# Patient Record
Sex: FEMALE | Race: BLACK OR AFRICAN AMERICAN | NOT HISPANIC OR LATINO | URBAN - METROPOLITAN AREA
[De-identification: names, ages, dates, MRNs, and addresses within clinical notes are randomized per-mention and may not be internally consistent; named-entity substitution may affect disease eponyms.]

---
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
## 2023-06-29 ENCOUNTER — EMERGENCY (EMERGENCY)
Facility: HOSPITAL | Age: 18
LOS: 0 days | Discharge: HOME | End: 2023-06-29
Attending: STUDENT IN AN ORGANIZED HEALTH CARE EDUCATION/TRAINING PROGRAM
Payer: COMMERCIAL

## 2023-06-29 VITALS
DIASTOLIC BLOOD PRESSURE: 73 MMHG | HEART RATE: 60 BPM | SYSTOLIC BLOOD PRESSURE: 109 MMHG | RESPIRATION RATE: 18 BRPM | OXYGEN SATURATION: 100 % | TEMPERATURE: 98 F

## 2023-06-29 VITALS — WEIGHT: 125 LBS | HEIGHT: 69 IN

## 2023-06-29 DIAGNOSIS — R07.9 CHEST PAIN, UNSPECIFIED: ICD-10-CM

## 2023-06-29 DIAGNOSIS — Z88.6 ALLERGY STATUS TO ANALGESIC AGENT: ICD-10-CM

## 2023-06-29 DIAGNOSIS — R35.0 FREQUENCY OF MICTURITION: ICD-10-CM

## 2023-06-29 DIAGNOSIS — R07.0 PAIN IN THROAT: ICD-10-CM

## 2023-06-29 DIAGNOSIS — B34.9 VIRAL INFECTION, UNSPECIFIED: ICD-10-CM

## 2023-06-29 DIAGNOSIS — R51.9 HEADACHE, UNSPECIFIED: ICD-10-CM

## 2023-06-29 DIAGNOSIS — R39.15 URGENCY OF URINATION: ICD-10-CM

## 2023-06-29 LAB
ALBUMIN SERPL ELPH-MCNC: 4.1 G/DL — SIGNIFICANT CHANGE UP (ref 3.3–5)
ALP SERPL-CCNC: 85 U/L — SIGNIFICANT CHANGE UP (ref 40–120)
ALT FLD-CCNC: 12 U/L — SIGNIFICANT CHANGE UP (ref 12–78)
ANION GAP SERPL CALC-SCNC: 3 MMOL/L — LOW (ref 5–17)
APPEARANCE UR: CLEAR — SIGNIFICANT CHANGE UP
AST SERPL-CCNC: 8 U/L — LOW (ref 15–37)
BASOPHILS # BLD AUTO: 0.03 K/UL — SIGNIFICANT CHANGE UP (ref 0–0.2)
BASOPHILS NFR BLD AUTO: 0.4 % — SIGNIFICANT CHANGE UP (ref 0–2)
BILIRUB SERPL-MCNC: 0.4 MG/DL — SIGNIFICANT CHANGE UP (ref 0.2–1.2)
BILIRUB UR-MCNC: NEGATIVE — SIGNIFICANT CHANGE UP
BUN SERPL-MCNC: 11 MG/DL — SIGNIFICANT CHANGE UP (ref 7–23)
CALCIUM SERPL-MCNC: 9.2 MG/DL — SIGNIFICANT CHANGE UP (ref 8.5–10.1)
CHLORIDE SERPL-SCNC: 107 MMOL/L — SIGNIFICANT CHANGE UP (ref 96–108)
CO2 SERPL-SCNC: 30 MMOL/L — SIGNIFICANT CHANGE UP (ref 22–31)
COLOR SPEC: YELLOW — SIGNIFICANT CHANGE UP
CREAT SERPL-MCNC: 0.86 MG/DL — SIGNIFICANT CHANGE UP (ref 0.5–1.3)
DIFF PNL FLD: NEGATIVE — SIGNIFICANT CHANGE UP
EGFR: 100 ML/MIN/1.73M2 — SIGNIFICANT CHANGE UP
EOSINOPHIL # BLD AUTO: 0.17 K/UL — SIGNIFICANT CHANGE UP (ref 0–0.5)
EOSINOPHIL NFR BLD AUTO: 2.5 % — SIGNIFICANT CHANGE UP (ref 0–6)
GLUCOSE SERPL-MCNC: 88 MG/DL — SIGNIFICANT CHANGE UP (ref 70–99)
GLUCOSE UR QL: NEGATIVE — SIGNIFICANT CHANGE UP
HCT VFR BLD CALC: 37.9 % — SIGNIFICANT CHANGE UP (ref 34.5–45)
HGB BLD-MCNC: 13.1 G/DL — SIGNIFICANT CHANGE UP (ref 11.5–15.5)
IMM GRANULOCYTES NFR BLD AUTO: 0.1 % — SIGNIFICANT CHANGE UP (ref 0–0.9)
KETONES UR-MCNC: NEGATIVE — SIGNIFICANT CHANGE UP
LEUKOCYTE ESTERASE UR-ACNC: ABNORMAL
LYMPHOCYTES # BLD AUTO: 3.09 K/UL — SIGNIFICANT CHANGE UP (ref 1–3.3)
LYMPHOCYTES # BLD AUTO: 46.3 % — HIGH (ref 13–44)
MCHC RBC-ENTMCNC: 31 PG — SIGNIFICANT CHANGE UP (ref 27–34)
MCHC RBC-ENTMCNC: 34.6 GM/DL — SIGNIFICANT CHANGE UP (ref 32–36)
MCV RBC AUTO: 89.6 FL — SIGNIFICANT CHANGE UP (ref 80–100)
MONOCYTES # BLD AUTO: 0.41 K/UL — SIGNIFICANT CHANGE UP (ref 0–0.9)
MONOCYTES NFR BLD AUTO: 6.1 % — SIGNIFICANT CHANGE UP (ref 2–14)
NEUTROPHILS # BLD AUTO: 2.96 K/UL — SIGNIFICANT CHANGE UP (ref 1.8–7.4)
NEUTROPHILS NFR BLD AUTO: 44.6 % — SIGNIFICANT CHANGE UP (ref 43–77)
NITRITE UR-MCNC: NEGATIVE — SIGNIFICANT CHANGE UP
PH UR: 7 — SIGNIFICANT CHANGE UP (ref 5–8)
PLATELET # BLD AUTO: 284 K/UL — SIGNIFICANT CHANGE UP (ref 150–400)
POTASSIUM SERPL-MCNC: 4 MMOL/L — SIGNIFICANT CHANGE UP (ref 3.5–5.3)
POTASSIUM SERPL-SCNC: 4 MMOL/L — SIGNIFICANT CHANGE UP (ref 3.5–5.3)
PROT SERPL-MCNC: 7.7 GM/DL — SIGNIFICANT CHANGE UP (ref 6–8.3)
PROT UR-MCNC: NEGATIVE — SIGNIFICANT CHANGE UP
RBC # BLD: 4.23 M/UL — SIGNIFICANT CHANGE UP (ref 3.8–5.2)
RBC # FLD: 12 % — SIGNIFICANT CHANGE UP (ref 10.3–14.5)
SODIUM SERPL-SCNC: 140 MMOL/L — SIGNIFICANT CHANGE UP (ref 135–145)
SP GR SPEC: 1.01 — SIGNIFICANT CHANGE UP (ref 1.01–1.02)
TROPONIN I, HIGH SENSITIVITY RESULT: <3 NG/L — SIGNIFICANT CHANGE UP
UROBILINOGEN FLD QL: NEGATIVE — SIGNIFICANT CHANGE UP
WBC # BLD: 6.67 K/UL — SIGNIFICANT CHANGE UP (ref 3.8–10.5)
WBC # FLD AUTO: 6.67 K/UL — SIGNIFICANT CHANGE UP (ref 3.8–10.5)

## 2023-06-29 PROCEDURE — 99284 EMERGENCY DEPT VISIT MOD MDM: CPT

## 2023-06-29 PROCEDURE — 81001 URINALYSIS AUTO W/SCOPE: CPT

## 2023-06-29 PROCEDURE — 87086 URINE CULTURE/COLONY COUNT: CPT

## 2023-06-29 PROCEDURE — 85025 COMPLETE CBC W/AUTO DIFF WBC: CPT

## 2023-06-29 PROCEDURE — 87591 N.GONORRHOEAE DNA AMP PROB: CPT

## 2023-06-29 PROCEDURE — 80053 COMPREHEN METABOLIC PANEL: CPT

## 2023-06-29 PROCEDURE — 36415 COLL VENOUS BLD VENIPUNCTURE: CPT

## 2023-06-29 PROCEDURE — 71045 X-RAY EXAM CHEST 1 VIEW: CPT

## 2023-06-29 PROCEDURE — 99285 EMERGENCY DEPT VISIT HI MDM: CPT | Mod: 25

## 2023-06-29 PROCEDURE — 93005 ELECTROCARDIOGRAM TRACING: CPT

## 2023-06-29 PROCEDURE — 93010 ELECTROCARDIOGRAM REPORT: CPT

## 2023-06-29 PROCEDURE — 71045 X-RAY EXAM CHEST 1 VIEW: CPT | Mod: 26

## 2023-06-29 PROCEDURE — 84484 ASSAY OF TROPONIN QUANT: CPT

## 2023-06-29 RX ORDER — ACETAMINOPHEN 500 MG
650 TABLET ORAL ONCE
Refills: 0 | Status: COMPLETED | OUTPATIENT
Start: 2023-06-29 | End: 2023-06-29

## 2023-06-29 RX ADMIN — Medication 650 MILLIGRAM(S): at 22:27
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
## 2023-06-29 NOTE — ED STATDOCS - EYES, MLM
Staff message received from Jesus Aj in PT with recommendation for work conditioning due to patient report of limitations in space to do home exercises for work simulated activities.  Provider is agreeable to this with order placed in [facility] and [facility] CM to contact patient to inform her of plan.   
clear bilaterally.  Pupils equal, round, and reactive to light.

## 2023-06-29 NOTE — ED STATDOCS - PATIENT PORTAL LINK FT
You can access the FollowMyHealth Patient Portal offered by [facility] by registering at the following website: [email or web address]. By joining [facility]’s FollowMyHealth portal, you will also be able to view your health information using other applications (apps) compatible with our system.
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
## 2023-06-29 NOTE — ED STATDOCS - NSFOLLOWUPINSTRUCTIONS_ED_ALL_ED_FT
[facility] General Internal Medicine  General Internal Medicine  [address] [place] [address]  Phone: [phone]  Fax:     [facility]  Internal Medicine  [address] [place] [address]  Phone: [phone]  Fax: [phone] Syndrome    WHAT YOU NEED TO KNOW:    Viral syndrome is a term used for symptoms of an infection caused by a virus. Viruses are spread easily from person to person through the air and on shared items.    DISCHARGE INSTRUCTIONS:    Call your local emergency number (911 in the ) or have someone else call if:    You have a seizure.    You cannot be woken.    You have chest pain or trouble breathing.  Seek care immediately if:    You have a stiff neck, a bad headache, and sensitivity to light.    You feel weak, dizzy, or confused.    You stop urinating or urinate a lot less than usual.    You cough up blood or thick yellow or green mucus.    You have severe abdominal pain or your abdomen is larger than usual.  Call your doctor if:    Your symptoms do not get better with treatment or get worse after 3 days.    You have a rash or ear pain.    You have burning when you urinate.    You have questions or concerns about your condition or care.  Medicines: You may need any of the following:    Acetaminophen decreases pain and fever. It is available without a doctor's order. Ask how much to take and how often to take it. Follow directions. Read the labels of all other medicines you are using to see if they also contain acetaminophen, or ask your doctor or pharmacist. Acetaminophen can cause liver damage if not taken correctly. Do not use more than 4 grams (4,000 milligrams) total of acetaminophen in one day.    NSAIDs, such as ibuprofen, help decrease swelling, pain, and fever. NSAIDs can cause stomach bleeding or kidney problems in certain people. If you take blood thinner medicine, always ask your healthcare provider if NSAIDs are safe for you. Always read the medicine label and follow directions.    Cold medicine helps decrease swelling, control a cough, and relieve chest or nasal congestion.    Saline nasal spray helps decrease nasal congestion.    Take your medicine as directed. Contact your healthcare provider if you think your medicine is not helping or if you have side effects. Tell him of her if you are allergic to any medicine. Keep a list of the medicines, vitamins, and herbs you take. Include the amounts, and when and why you take them. Bring the list or the pill bottles to follow-up visits. Carry your medicine list with you in case of an emergency.  Manage your symptoms:    Drink liquids as directed to prevent dehydration. Ask how much liquid to drink each day and which liquids are best for you. Ask if you should drink an oral rehydration solution (ORS). An ORS has the right amounts of water, salts, and sugar you need to replace body fluids. This may help prevent dehydration caused by vomiting or diarrhea. Do not drink liquids with caffeine. Liquids with caffeine can make dehydration worse.    Get plenty of rest to help your body heal. Take naps throughout the day. Ask your healthcare provider when you can return to work and your normal activities.    Use a cool mist humidifier to help you breathe easier. Ask your healthcare provider how to use a cool mist humidifier.    Eat honey or use cough drops for a sore throat. Cough drops are available without a doctor's order. Follow directions for taking cough drops.    Do not smoke or be close to anyone who is smoking. Nicotine and other chemicals in cigarettes and cigars can cause lung damage. Smoking can also delay healing. Ask your healthcare provider for information if you currently smoke and need help to quit. E-cigarettes or smokeless tobacco still contain nicotine. Talk to your healthcare provider before you use these products.  Prevent the spread of germs:      Wash your hands often. Wash your hands several times each day. Wash after you use the bathroom, change a child's diaper, and before you prepare or eat food. Use soap and water every time. Rub your soapy hands together, lacing your fingers. Wash the front and back of your hands, and in between your fingers. Use the fingers of one hand to scrub under the fingernails of the other hand. Wash for at least 20 seconds. Rinse with warm, running water for several seconds. Then dry your hands with a clean towel or paper towel. Use hand  that contains alcohol if soap and water are not available. Do not touch your eyes, nose, or mouth without washing your hands first.  Handwashing      Cover a sneeze or cough. Use a tissue that covers your mouth and nose. Throw the tissue away in a trash can right away. Use the bend of your arm if a tissue is not available. Wash your hands well with soap and water or use a hand .    Stay away from others while you are sick. Avoid crowds as much as possible.    Ask about vaccines you may need. Talk to your healthcare provider about your vaccine history. He or she will tell you which vaccines you need, and when to get them.  Get the influenza (flu) vaccine as soon as recommended each year. The flu vaccine is available starting in September or October. Flu viruses change, so it is important to get a flu vaccine every year.    Get the pneumonia vaccine if recommended. This vaccine is usually recommended every 5 years. Your provider will tell you when to get this vaccine, if needed.  Follow up with your doctor as directed: Write down your questions so you remember to ask them during your visits.    Síndrome viral    LO QUE NECESITA SABER:    El síndrome viral es un término usado para los síntomas de annita infección causada por un virus. Los virus son propagados fácilmente de annita persona a otra a través del aire y mediante los objetos que se comparten.    INSTRUCCIONES SOBRE EL MARIAM HOSPITALARIA:    Llame al número local de emergencias (911 en los [place]), o pídale a alguien que llame si:    Usted sufre annita convulsión.    No es posible despertarlo.    Usted tiene dolor torácico y dificultad para respirar.  Busque atención médica de inmediato si:    Usted tiene rigidez en el rebecca, dolor de sergio intenso y sensibilidad a la fifi.    Usted se siente débil, mareado o confundido.    Usted juventino de orinar u orina menos de lo habitual.    Usted tose pio o annita mucosidad espesa amarilla o michael.    Usted tiene dolor abdominal severo o wood abdomen está más handy de lo habitual.  Llame a wood médico si:    Nahomy síntomas no mejoran con el tratamiento o empeoran después de 3 días.    Usted tiene salpullido o dolor de oído.    Usted siente ardor al orinar.    Usted tiene preguntas o inquietudes acerca de wood condición o cuidado.  Medicamentos:Es posible que usted necesite alguno de los siguientes:    Acetaminofénalivia el dolor y baja la fiebre. Está disponible sin receta médica. Pregunte la cantidad y la frecuencia con que debe tomarlos. Siga las indicaciones. Shazia las etiquetas de todos los demás medicamentos que esté usando para saber si también contienen acetaminofén, o pregunte a wood médico o farmacéutico. El acetaminofén puede causar daño en el hígado cuando no se katey de forma correcta. No use más de 4 gramos (4000 miligramos) en total de acetaminofeno en un día.    Los ALICIA,ina el ibuprofeno, ayudan a disminuir la inflamación, el dolor y la fiebre. Los ALICIA pueden causar sangrado estomacal o problemas renales en ciertas personas. Si usted katey un medicamento anticoagulante, siempre pregúntele a wood médico si los ALICIA son seguros para usted. Siempre shazia la etiqueta de varinder medicamento y siga las instrucciones.    El medicamento para el resfriadoayuda a disminuir la inflamación, a controlar la tos y a aliviar la congestión del pecho o nasal.    El rociador nasal de agua salinaayuda a disminuir la congestión nasal.    Champion nahomy medicamentos ina se le haya indicado.Consulte con wood médico si usted kristy que wood medicamento no le está ayudando o si presenta efectos secundarios. Infórmele si es alérgico a algún medicamento. Mantenga annita lista actualizada de los medicamentos, las vitaminas y los productos herbales que katey. Incluya los siguientes datos de los medicamentos: cantidad, frecuencia y motivo de administración. Traiga con usted la lista o los envases de las píldoras a nahomy citas de seguimiento. Lleve la lista de los medicamentos con usted en senia de annita emergencia.  El manejo de nahomy síntomas:    Champion líquidos ina se le indique para evitar annita deshidratación.Pregunte cuánto líquido debe charlotte cada día y cuáles líquidos son los más adecuados para usted. Pregunte si usted debería charlotte annita solución de rehidratación oral (SRO). Annita SRO tiene las cantidades exactas de agua, sal y azúcar que usted necesita para reemplazar los líquidos corporales. [place] podría ayudarlo a evitar la deshidratación a causa del vómito y de la diarrea. No tome líquidos con cafeína. Los líquidos con cafeína pueden empeorar la deshidratación.    Descanse lo suficiente para ayudar a que wood cuerpo sane.Champion siestas jose el día. Pregunte a wood médico cuándo puede regresar a wood trabajo y a nahomy actividades cotidianas.    Use un humidificador de hugo fría para respirar más fácilmente.Pregunte a wood médico cómo usar un humidificador de vapor frío.    Coma miel o use pastillas para la tos para el dolor de garganta.Los caramelos para la tos están disponibles sin receta médica. Siga las indicaciones para charlotte los caramelos para la tos.    No fume ni esté cerca a alguien que esté fumando.La nicotina y otras sustancias químicas que contienen los cigarrillos y cigarros pueden dañar los pulmones. El fumar también puede retrasar la sanación. Pida información a wood médico si usted actualmente fuma y necesita ayuda para dejar de fumar. Los cigarrillos electrónicos o el tabaco sin humo igualmente contienen nicotina. Consulte con wood médico antes de utilizar estos productos.  Prevenga la propagación de gérmenes:      Lávese las gisella frecuentemente.Lávese las gisella varias veces al día. Lávese después de usar el baño, después de cambiar pañales y antes de preparar la comida o comer. Use siempre agua y jabón. Frótese las gisella enjabonadas, entrelazando los dedos. Lávese el frente y el dorso de las gisella, y entre los dedos. Use los dedos de annita mano para restregar debajo de las uñas de la otra mano. Lávese jose al menos 20 segundos. Enjuague con agua corriente caliente jose varios segundos. Luego séquese las gisella con annita toalla limpia o annita toalla de papel. Puede usar un desinfectante para gisella que contenga alcohol, si no hay agua y jabón disponibles. No se toque los ojos, la nariz o la boca sin antes lavarse las gisella.  Lavado de gisella      Cúbrase al toser o estornudar.Use un pañuelo que cubra la boca y la nariz. Arroje el pañuelo a la basura de inmediato. Use el ángulo del brazo si no tiene un pañuelo disponible. Lávese las gisella con agua y jabón o use un desinfectante de gisella.    Manténgase alejado de los demás mientras esté enfermo.Evite las multitudes lo más que pueda.    Pregunte sobre las vacunas que pudiera necesitar.Hable con wood médico sobre wood historial de vacunación. Wood médico le indicará qué vacunas necesita y cuándo recibirlas.  Vacúnese contra la influenza (gripe) tan pronto ina se recomiende cada año.La vacuna antigripal se ofrece a partir de septiembre u octubre. Los virus de la gripe cambian, por lo que es importante vacunarse contra la gripe cada año.    Vacúnese contra la neumonía si se recomienda.Esta vacuna generalmente se recomienda cada 5 años. Wood médico le indicará cuándo recibir esta vacuna, de ser necesaria.  Acuda a la consulta de control con wood médico según las indicaciones:Anote nahomy preguntas para que se acuerde de hacerlas jose nahomy visitas.

## 2023-06-30 LAB
BACTERIA # UR AUTO: ABNORMAL
EPI CELLS # UR: SIGNIFICANT CHANGE UP
N GONORRHOEA RRNA SPEC QL NAA+PROBE: SIGNIFICANT CHANGE UP
RBC CASTS # UR COMP ASSIST: SIGNIFICANT CHANGE UP /HPF (ref 0–4)
SPECIMEN SOURCE: SIGNIFICANT CHANGE UP
WBC UR QL: ABNORMAL /HPF (ref 0–5)

## 2023-07-01 LAB
CULTURE RESULTS: NO GROWTH — SIGNIFICANT CHANGE UP
SPECIMEN SOURCE: SIGNIFICANT CHANGE UP

## 2024-06-25 ENCOUNTER — APPOINTMENT (OUTPATIENT)
Dept: ORTHOPEDIC SURGERY | Facility: CLINIC | Age: 19
End: 2024-06-25
Payer: COMMERCIAL

## 2024-06-25 VITALS — HEIGHT: 69 IN | WEIGHT: 130 LBS | BODY MASS INDEX: 19.26 KG/M2

## 2024-06-25 DIAGNOSIS — M54.2 CERVICALGIA: ICD-10-CM

## 2024-06-25 DIAGNOSIS — Z78.9 OTHER SPECIFIED HEALTH STATUS: ICD-10-CM

## 2024-06-25 DIAGNOSIS — M62.838 OTHER MUSCLE SPASM: ICD-10-CM

## 2024-06-25 PROCEDURE — 72040 X-RAY EXAM NECK SPINE 2-3 VW: CPT

## 2024-06-25 PROCEDURE — 99204 OFFICE O/P NEW MOD 45 MIN: CPT

## 2024-06-25 PROCEDURE — 72070 X-RAY EXAM THORAC SPINE 2VWS: CPT

## 2024-06-25 RX ORDER — MELOXICAM 15 MG/1
15 TABLET ORAL DAILY
Qty: 30 | Refills: 2 | Status: ACTIVE | COMMUNITY
Start: 2024-06-25 | End: 1900-01-01

## 2024-08-06 ENCOUNTER — APPOINTMENT (OUTPATIENT)
Dept: ORTHOPEDIC SURGERY | Facility: CLINIC | Age: 19
End: 2024-08-06

## 2024-08-06 PROBLEM — M54.16 LUMBAR RADICULAR PAIN: Status: ACTIVE | Noted: 2024-08-06

## 2024-08-06 PROCEDURE — 99213 OFFICE O/P EST LOW 20 MIN: CPT
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
## 2024-08-06 NOTE — IMAGING
[Straightening consistent with spasm] : Straightening consistent with spasm [No bony abnormalities] : No bony abnormalities [FreeTextEntry1] : 22 degree scoliosis.  no fractures
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
## 2024-08-06 NOTE — DATA REVIEWED
[Cervical Spine] : cervical spine [MRI] : MRI [Lumbar Spine] : lumbar spine [I independently reviewed and interpreted images and report] : I independently reviewed and interpreted images and report [FreeTextEntry1] : There is straightening of the normal cervical lordosis.  The cervicomedullary junction is intact.     There is no compression fracture.  The disc spaces are hydrated.     The nasopharynx is unremarkable. The visualized surrounding soft tissue structures of the neck are unremarkable, although the study is not optimized to assess the surrounding structures, and pathology may be missed as result.     Evaluation of the disc spaces demonstrates:     C2-C3: No disc bulge or herniation and the facet joints are normal.     C3-C4: No disc bulge or herniation and the facet joints are normal.     C4-C5: No disc bulge or herniation and the facet joints are normal.     C5-C6: No disc bulge or herniation and the facet joints are normal.     C6-C7: Disc bulge indenting the thecal sac.     C7-T1: No disc bulge or herniation and the facet joints are normal.     Impression:     C6-C7: Disc bulge indenting the thecal sac.     Straightening of the normal lordosis, which may be secondary to spasm. [FreeTextEntry2] : There is normal lumbar lordosis.  Mild upper lumbar curvature to the right is noted.  There is no compression fracture present.  The conus medullaris terminates at L1-L2.  The disc spaces are hydrated.     The visualized surrounding intra-abdominal and pelvic structures are unremarkable, although the study is not optimized to assess the surrounding structures, and pathology may be missed as a result.     Evaluation of the disc spaces demonstrates:     L1-L2: No disc bulge or herniation and the facet joints are normal.     L2-L3: No disc bulge or herniation and the facet joints are normal.     L3-L4: Disc bulge indenting the thecal sac.     L4-L5: Disc bulge indenting the thecal sac, lateral recesses, and foramina, abutting the bilateral exiting L4 nerve roots.     L5-S1: Broad-based central posterior disc herniation indenting the thecal sac, lateral recesses, and foramina, abutting the bilateral exiting L5 nerve roots.     Impression:     L3-L4: Disc bulge indenting the thecal sac.     L4-L5: Disc bulge indenting the thecal sac, lateral recesses, and foramina, abutting the bilateral exiting L4 nerve roots.     L5-S1: Broad-based central posterior disc herniation indenting the thecal sac, lateral recesses, and foramina, abutting the bilateral exiting L5 nerve roots.  Please correlate for radiculopathy in this distribution.  An EMG may be beneficial for further assessment.     Mild upper lumbar curvature to the right, which may be secondary to spasm.

## 2024-08-06 NOTE — PHYSICAL EXAM
[Rotation to left] : rotation to left [Rotation to right] : rotation to right [] : negative Gamez reflex

## 2024-08-07 ENCOUNTER — APPOINTMENT (OUTPATIENT)
Dept: MRI IMAGING | Facility: CLINIC | Age: 19
End: 2024-08-07

## 2024-08-07 ENCOUNTER — APPOINTMENT (OUTPATIENT)
Dept: ORTHOPEDIC SURGERY | Facility: CLINIC | Age: 19
End: 2024-08-07

## 2024-08-07 PROBLEM — M75.02 ADHESIVE BURSITIS OF LEFT SHOULDER: Status: ACTIVE | Noted: 2024-08-07

## 2024-08-07 PROBLEM — M75.22 BICEPS TENDINITIS OF LEFT UPPER EXTREMITY: Status: ACTIVE | Noted: 2024-08-07

## 2024-08-07 PROBLEM — M25.512 ACUTE PAIN OF LEFT SHOULDER: Status: ACTIVE | Noted: 2024-08-07

## 2024-08-07 PROBLEM — M75.42 IMPINGEMENT SYNDROME OF LEFT SHOULDER: Status: ACTIVE | Noted: 2024-08-07

## 2024-08-07 PROCEDURE — 99214 OFFICE O/P EST MOD 30 MIN: CPT

## 2024-08-07 PROCEDURE — 73030 X-RAY EXAM OF SHOULDER: CPT | Mod: LT

## 2024-08-07 PROCEDURE — 73221 MRI JOINT UPR EXTREM W/O DYE: CPT | Mod: LT

## 2024-08-07 NOTE — IMAGING
[de-identified] : The patient is a well appearing 18 year old female of their stated age. Neck is supple & nontender to palpation. Negative Spurling's test.   General: in no acute distress, seated comfortably, moving easily Skin: No discoloration, rashes; on palpation skin is dry, Neuro: Normal sensation all dermatomes, motor all myotomes Vascular: Normal pulses, no edema, normal temperature Coordination and balance: Normal Psych: normal mood and affect, non pressured speech, alert and oriented x3  LEFT SHOULDER Inspection: No swelling. Palpation: Tenderness is noted at the bicipital groove, anterior and lateral. Range of motion: There is pain with range of motion. , ER 55, @90ER 90, @90IR 30 Strength: There is pain and discomfort with strength testing. Forward Flexion 4/5. Abduction 4/5. External Rotation 5-/5 and Internal Rotation 5/5 Neurological testings: motor and sensor intact distally. Ligament Stability and Special Tests: There is positive arc of pain. Shoulder apprehension: neg Shoulder relocation: neg Nolasco's test: pos Biceps Active test: neg Gaffney Labral Shear: neg Impingement testing: pos Iram testing: pos Whipple: pos Cross Body Adduction: neg   X-RAYS of the LEFT shoulder (AP, SCAPULAR Y, AND AXILLARY VIEWS): no fracture or dislocation; Bigliani Type 2 acromion; mild degenerative change at ACJ; subacromial spurring
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
## 2024-08-07 NOTE — DISCUSSION/SUMMARY
[de-identified] : Assessment: The patient is a 18 year old female with [] and physical exam findings consistent with left shoulder impingement.  Patient and I discussed their symptoms. Discussed findings of today's exam and possible causes of patient's pain. Educated patient on their most probable diagnosis. Reviewed possible courses of treatment, and we collaboratively decided best course of treatment at this time will include:  1. MRI left shoulder - r/o RTC tear   The patient's current medication management of their orthopedic diagnosis was reviewed today:   (1) We discussed a comprehensive treatment plan that included possible pharmaceutical management involving the use of prescription strength medications including but not limited to options such as oral Naprosyn 500mg BID, once daily Meloxicam 15 mg, or 500-650 mg Tylenol versus over the counter oral medications and topical prescription NSAID Pennsaid vs over the counter Voltaren gel.   (2) There is a moderate risk of morbidity with further treatment, especially from use of prescription strength medications and possible side effects of these medications which include upset stomach with oral medications, skin reactions to topical medications and cardiac/renal issues with long term use.   (3) I recommended that the patient follow-up with their medical physician to discuss any significant specific potential issues with long term medication use such as interactions with current medications or with exacerbation of underlying medical comorbidities.   (4) The benefits and risks associated with use of injectable, oral or topical, prescription and over the counter anti-inflammatory medications were discussed with the patient. The patient voiced understanding of the risks including but not limited to bleeding, stroke, kidney dysfunction, heart disease, and were referred to the black box warning label for further information.  Follow up after MRI.
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
## 2024-08-20 NOTE — DATA REVIEWED
[MRI] : MRI [Left] : left [Shoulder] : shoulder [Report was reviewed and noted in the chart] : The report was reviewed and noted in the chart [I independently reviewed and interpreted images and report] : I independently reviewed and interpreted images and report [I reviewed the films/CD] : I reviewed the films/CD [FreeTextEntry1] : [facility] 8/7/24 1. Moderate fluid signal and inflammatory changes focally in the posterior lateral subdeltoid bursa surrounding the teres minor tendon insertion consistent with bursitis in the absence of recent therapeutic injection to the area 2. Slight partial tearing and delamination of the posterior infraspinatus tendon insertion without retraction 3. No acute osseous injury or muscle atrophy 4. Clinical correlation regarding any possible recent therapeutic injection is recommended
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
## 2024-08-20 NOTE — IMAGING
[de-identified] : The patient is a well appearing 18 year old female of their stated age. Neck is supple & nontender to palpation. Negative Spurling's test.   General: in no acute distress, seated comfortably, moving easily Skin: No discoloration, rashes; on palpation skin is dry, Neuro: Normal sensation all dermatomes, motor all myotomes Vascular: Normal pulses, no edema, normal temperature Coordination and balance: Normal Psych: normal mood and affect, non pressured speech, alert and oriented x3  LEFT SHOULDER Inspection: No swelling. Palpation: Tenderness is noted at the bicipital groove, anterior and lateral. Range of motion: There is pain with range of motion. , ER 55, @90ER 90, @90IR 30 Strength: There is pain and discomfort with strength testing. Forward Flexion 4/5. Abduction 4/5. External Rotation 5-/5 and Internal Rotation 5/5 Neurological testings: motor and sensor intact distally. Ligament Stability and Special Tests: There is positive arc of pain. Shoulder apprehension: neg Shoulder relocation: neg Nolasco's test: pos Biceps Active test: neg Gaffney Labral Shear: neg Impingement testing: pos Iram testing: pos Whipple: pos Cross Body Adduction: neg   X-RAYS of the LEFT shoulder (AP, SCAPULAR Y, AND AXILLARY VIEWS): no fracture or dislocation; Bigliani Type 2 acromion; mild degenerative change at ACJ; subacromial spurring

## 2024-08-21 ENCOUNTER — APPOINTMENT (OUTPATIENT)
Dept: ORTHOPEDIC SURGERY | Facility: CLINIC | Age: 19
End: 2024-08-21

## 2024-08-21 DIAGNOSIS — S46.012D STRAIN OF MUSCLE(S) AND TENDON(S) OF THE ROTATOR CUFF OF LEFT SHOULDER, SUBSEQUENT ENCOUNTER: ICD-10-CM

## 2024-08-21 DIAGNOSIS — M75.02 ADHESIVE CAPSULITIS OF LEFT SHOULDER: ICD-10-CM

## 2024-08-21 PROCEDURE — 99214 OFFICE O/P EST MOD 30 MIN: CPT

## 2024-09-03 ENCOUNTER — APPOINTMENT (OUTPATIENT)
Dept: ORTHOPEDIC SURGERY | Facility: CLINIC | Age: 19
End: 2024-09-03
Payer: COMMERCIAL

## 2024-09-03 VITALS — HEIGHT: 69 IN | WEIGHT: 130 LBS | BODY MASS INDEX: 19.26 KG/M2

## 2024-09-03 DIAGNOSIS — M54.2 CERVICALGIA: ICD-10-CM

## 2024-09-03 PROCEDURE — 99213 OFFICE O/P EST LOW 20 MIN: CPT
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
## 2024-10-30 ENCOUNTER — APPOINTMENT (OUTPATIENT)
Dept: ORTHOPEDIC SURGERY | Facility: CLINIC | Age: 19
End: 2024-10-30

## 2024-10-30 VITALS — WEIGHT: 130 LBS | BODY MASS INDEX: 19.26 KG/M2 | HEIGHT: 69 IN

## 2024-10-30 DIAGNOSIS — M75.42 IMPINGEMENT SYNDROME OF LEFT SHOULDER: ICD-10-CM

## 2024-10-30 DIAGNOSIS — S46.012D STRAIN OF MUSCLE(S) AND TENDON(S) OF THE ROTATOR CUFF OF LEFT SHOULDER, SUBSEQUENT ENCOUNTER: ICD-10-CM

## 2024-10-30 PROCEDURE — 99214 OFFICE O/P EST MOD 30 MIN: CPT
